# Patient Record
(demographics unavailable — no encounter records)

---

## 2025-05-19 NOTE — HISTORY OF PRESENT ILLNESS
[FreeTextEntry1] : f/up atopic dermatitis [de-identified] : 19 y/o F w/ food allergies and asthma accompanied by mother here for:  1) Longstanding eczema on extremities. Started Dupixent in 2/2020 and notes significant improvement in her skin. No longer itchy and skin is less red and inflamed. No ocular issues. No cold sores. Otherwise, no new, evolving, or symptomatic skin lesions.

## 2025-05-19 NOTE — PHYSICAL EXAM
[Alert] : alert [Oriented x 3] : ~L oriented x 3 [Well Nourished] : well nourished [Conjunctiva Non-injected] : conjunctiva non-injected [No Visual Lymphadenopathy] : no visual  lymphadenopathy [No Clubbing] : no clubbing [No Edema] : no edema [No Bromhidrosis] : no bromhidrosis [No Chromhidrosis] : no chromhidrosis [FreeTextEntry3] : Skin clear